# Patient Record
Sex: FEMALE | Race: WHITE
[De-identification: names, ages, dates, MRNs, and addresses within clinical notes are randomized per-mention and may not be internally consistent; named-entity substitution may affect disease eponyms.]

---

## 2018-08-24 ENCOUNTER — HOSPITAL ENCOUNTER (INPATIENT)
Dept: HOSPITAL 74 - JLDR | Age: 28
LOS: 2 days | Discharge: HOME | DRG: 560 | End: 2018-08-26
Attending: OBSTETRICS & GYNECOLOGY | Admitting: OBSTETRICS & GYNECOLOGY
Payer: COMMERCIAL

## 2018-08-24 VITALS — BODY MASS INDEX: 36.9 KG/M2

## 2018-08-24 DIAGNOSIS — O48.0: Primary | ICD-10-CM

## 2018-08-24 DIAGNOSIS — Z3A.40: ICD-10-CM

## 2018-08-24 LAB
ANION GAP SERPL CALC-SCNC: 10 MMOL/L (ref 8–16)
APTT BLD: 24.3 SECONDS (ref 25.2–36.5)
BASOPHILS # BLD: 0.8 % (ref 0–2)
BUN SERPL-MCNC: 7 MG/DL (ref 7–18)
CALCIUM SERPL-MCNC: 8.5 MG/DL (ref 8.5–10.1)
CHLORIDE SERPL-SCNC: 107 MMOL/L (ref 98–107)
CO2 SERPL-SCNC: 22 MMOL/L (ref 21–32)
CREAT SERPL-MCNC: 0.5 MG/DL (ref 0.55–1.02)
DEPRECATED RDW RBC AUTO: 16 % (ref 11.6–15.6)
EOSINOPHIL # BLD: 1.4 % (ref 0–4.5)
GLUCOSE SERPL-MCNC: 81 MG/DL (ref 74–106)
HCT VFR BLD CALC: 32.1 % (ref 32.4–45.2)
HGB BLD-MCNC: 10.2 GM/DL (ref 10.7–15.3)
INR BLD: 0.91 (ref 0.83–1.09)
LYMPHOCYTES # BLD: 37.1 % (ref 8–40)
MCH RBC QN AUTO: 23.9 PG (ref 25.7–33.7)
MCHC RBC AUTO-ENTMCNC: 31.9 G/DL (ref 32–36)
MCV RBC: 75 FL (ref 80–96)
MONOCYTES # BLD AUTO: 5.3 % (ref 3.8–10.2)
NEUTROPHILS # BLD: 55.4 % (ref 42.8–82.8)
PLATELET # BLD AUTO: 292 K/MM3 (ref 134–434)
PMV BLD: 9.5 FL (ref 7.5–11.1)
POTASSIUM SERPLBLD-SCNC: 4.2 MMOL/L (ref 3.5–5.1)
PT PNL PPP: 10.3 SEC (ref 9.7–13)
RBC # BLD AUTO: 4.28 M/MM3 (ref 3.6–5.2)
SODIUM SERPL-SCNC: 139 MMOL/L (ref 136–145)
WBC # BLD AUTO: 8.5 K/MM3 (ref 4–10)

## 2018-08-24 PROCEDURE — 0HQ9XZZ REPAIR PERINEUM SKIN, EXTERNAL APPROACH: ICD-10-PCS | Performed by: OBSTETRICS & GYNECOLOGY

## 2018-08-24 RX ADMIN — ACETAMINOPHEN PRN MG: 325 TABLET ORAL at 13:10

## 2018-08-24 RX ADMIN — IBUPROFEN PRN MG: 600 TABLET, FILM COATED ORAL at 13:10

## 2018-08-24 RX ADMIN — SODIUM CHLORIDE, SODIUM LACTATE, POTASSIUM CHLORIDE, CALCIUM CHLORIDE AND DEXTROSE MONOHYDRATE SCH MLS/HR: 5; 600; 310; 30; 20 INJECTION, SOLUTION INTRAVENOUS at 06:20

## 2018-08-24 RX ADMIN — FERROUS SULFATE TAB EC 324 MG (65 MG FE EQUIVALENT) SCH MG: 324 (65 FE) TABLET DELAYED RESPONSE at 17:05

## 2018-08-24 NOTE — HP
Past Medical History





- Primary Care Physician


PCP:: Michael Ramirez





- Admission


Chief Complaint: pregnancy 40 weeks , labor


History of Present Illness: 





29 yo f  edc 18 in labor, cx 3 cm ,80 vx -2 mi, bulging , fhr cat 1

, irregular contraction. prenatal care at [facility] care


History Source: Patient


Limitations to Obtaining History: No Limitations





- Past Medical History


...: 2


...Para: 1


...Term: 1


...: 0


...Spon : 0


...Induced : 0


...Multiple Gestation: 0


...LMP: 11/15/17


... Weeks Gestation by Dates: 40.2


...EDC by Dates: 18


...EDC by Sono: 18





- Past Surgical History


Hx Myomectomy: No


Hx Transabdominal Cerclage: No





- Smoking History


Smoking history: Never smoked


Have you smoked in the past 12 months: No





- Alcohol/Substance Use


Hx Alcohol Use: No





- Social History


History of Recent Travel: No





Home Medications





- Allergies


Allergies/Adverse Reactions: 


 Allergies











Allergy/AdvReac Type Severity Reaction Status Date / Time


 


No Known Drug Allergies Allergy   Verified 18 06:31














- Home Medications


Home Medications: 


Ambulatory Orders





Ferrous Sulfate [Feosol] 325 mg PO DAILY 18 


Prenatal Vit 108/Iron/Folic AC [Prenatal One Tablet] 1 each PO DAILY 18 











Review of Systems





- Review of Systems


Constitutional: reports: No Symptoms


Eyes: reports: No Symptoms


HENT: reports: No Symptoms


Neck: reports: No Symptoms


Cardiovascular: reports: No Symptoms


Respiratory: reports: No Symptoms


Gastrointestinal: reports: No Symptoms


Genitourinary: reports: No Symptoms


Breasts: reports: No Symptoms Reported


Musculoskeletal: reports: No Symptoms


Integumentary: reports: No Symptoms


Neurological: reports: No Symptoms


Endocrine: reports: No Symptoms


Hematology/Lymphatic: reports: No Symptoms


Psychiatric: reports: No Symptoms





Physical Exam - Maternity


Vital Signs: 


 Vital Signs











Temperature  98.9 F   18 07:00


 


Pulse Rate  99 H  18 07:00


 


Respiratory Rate  20   18 07:00


 


Blood Pressure  135/83   18 07:00


 


O2 Sat by Pulse Oximetry (%)      











Constitutional: Yes: Well Nourished, No Distress, Calm


Eyes: Yes: WNL, Conjunctiva Clear, EOM Intact


HENT: Yes: WNL, Atraumatic, Normocephalic


Neck: Yes: WNL, Supple, Trachea Midline


Cardiovascular: Yes: WNL, Regular Rate and Rhythm


Breast(s): Yes: WNL





- Abdominal Exam/OB


Fundal Height: 40


Number of Fetuses: Single


Fetal Presentation: Vertex


Contractions: Yes


Regularity: Irregular


Intensity: Moderate


Fetal Monitor Mode: External


Fetal Heart Rate Location: [facility]


Category: I


Accelerations: Uniform


Decelerations: None





- Vaginal Exam/OB


Vaginal Bleediing: No


Speculum Exam: No


Dilatation (cm): 3 cm


Effacement (%): 80


Amniotic Membrane Status: Bulging


Fetal Presentation: Vertex/Position


Fetal Station: -2





- Physical Exam


Musculoskeletal: Yes: WNL


Extremities: Yes: WNL


Edema: LLE: Trace, RLE: Trace


Deep Tendon Reflex Grade: Normal +2





- Labs


Lab Results: 


 CBC, BMP





 18 06:35 











Hemorrhage Risk Assessment





- Risk Factors


Medium Risk Factors: Yes: None


High Risk Factors: Yes: None


Risk Score: 1


Risk Level: Medium Risk





Problem List





- Problems


(1) 40 weeks gestation of pregnancy


Code(s): Z3A.40 - 40 WEEKS GESTATION OF PREGNANCY   





(2) Labor established


Code(s): RTM5870 -    





Assessment/Plan





admit, fhm, GBS positive, iv antibiotic , 


pitocin stimulation, rba discussed

## 2018-08-25 LAB
BASOPHILS # BLD: 0.8 % (ref 0–2)
DEPRECATED RDW RBC AUTO: 16.3 % (ref 11.6–15.6)
EOSINOPHIL # BLD: 0.9 % (ref 0–4.5)
HCT VFR BLD CALC: 29.7 % (ref 32.4–45.2)
HGB BLD-MCNC: 9.3 GM/DL (ref 10.7–15.3)
LYMPHOCYTES # BLD: 30.5 % (ref 8–40)
MCH RBC QN AUTO: 23.6 PG (ref 25.7–33.7)
MCHC RBC AUTO-ENTMCNC: 31.3 G/DL (ref 32–36)
MCV RBC: 75.4 FL (ref 80–96)
MONOCYTES # BLD AUTO: 4.7 % (ref 3.8–10.2)
NEUTROPHILS # BLD: 63.1 % (ref 42.8–82.8)
PLATELET # BLD AUTO: 270 K/MM3 (ref 134–434)
PMV BLD: 9.3 FL (ref 7.5–11.1)
RBC # BLD AUTO: 3.94 M/MM3 (ref 3.6–5.2)
WBC # BLD AUTO: 11.2 K/MM3 (ref 4–10)

## 2018-08-25 RX ADMIN — ACETAMINOPHEN PRN MG: 325 TABLET ORAL at 22:21

## 2018-08-25 RX ADMIN — SODIUM CHLORIDE, SODIUM LACTATE, POTASSIUM CHLORIDE, CALCIUM CHLORIDE AND DEXTROSE MONOHYDRATE SCH: 5; 600; 310; 30; 20 INJECTION, SOLUTION INTRAVENOUS at 22:35

## 2018-08-25 RX ADMIN — Medication SCH: at 22:35

## 2018-08-25 RX ADMIN — Medication SCH: at 09:03

## 2018-08-25 RX ADMIN — FERROUS SULFATE TAB EC 324 MG (65 MG FE EQUIVALENT) SCH MG: 324 (65 FE) TABLET DELAYED RESPONSE at 08:48

## 2018-08-25 RX ADMIN — FERROUS SULFATE TAB EC 324 MG (65 MG FE EQUIVALENT) SCH MG: 324 (65 FE) TABLET DELAYED RESPONSE at 17:52

## 2018-08-25 RX ADMIN — IBUPROFEN PRN MG: 600 TABLET, FILM COATED ORAL at 22:21

## 2018-08-25 RX ADMIN — Medication SCH TAB: at 09:48

## 2018-08-25 NOTE — PN
Post Partum Progress Note





- Subjective


Subjective: 





no complains, except mild cramps 


Post Partum Day: 1


Type of Delivery: 


Vital Signs: 


 Vital Signs











Temperature  98.0 F   18 06:00


 


Pulse Rate  60   18 06:00


 


Respiratory Rate  20   18 06:00


 


Blood Pressure  120/80   18 06:00


 


O2 Sat by Pulse Oximetry (%)      











Breast Exam: Yes: Soft, Other (Breast & bottle feeding ).  No: Engorged


Uterus: Yes: Fundus Firm, Fundus below umbilicus, Non-tender


Lochia: Yes: Rubra


Lochia, amount: Moderate


Extremities: Yes: Calves non-tender, Edema


Perineum: Yes: Laceration


Activity: Ambulating





- Labs


Labs: 


 CBC











WBC  8.5 K/mm3 (4.0-10.0)   18  06:35    


 


RBC  4.28 M/mm3 (3.60-5.2)   18  06:35    


 


Hgb  10.2 GM/dL (10.7-15.3)  L  18  06:35    


 


Hct  32.1 % (32.4-45.2)  L  18  06:35    


 


MCV  75.0 fl (80-96)  L  18  06:35    


 


MCH  23.9 pg (25.7-33.7)  L  18  06:35    


 


MCHC  31.9 g/dl (32.0-36.0)  L  18  06:35    


 


RDW  16.0 % (11.6-15.6)  H  18  06:35    


 


Plt Count  292 K/MM3 (134-434)   18  06:35    


 


MPV  9.5 fl (7.5-11.1)   18  06:35    


 


Absolute Neuts (auto)  4.7 K/mm3 (1.5-8.0)   18  06:35    


 


Neutrophils %  55.4 % (42.8-82.8)   18  06:35    


 


Lymphocytes %  37.1 % (8-40)   18  06:35    


 


Monocytes %  5.3 % (3.8-10.2)   18  06:35    


 


Eosinophils %  1.4 % (0-4.5)   18  06:35    


 


Basophils %  0.8 % (0-2.0)   18  06:35    


 


Nucleated RBC %  0 % (0-0)   18  06:35    














Problem List





- Problems


(1) Vaginal delivery


Code(s): O80 - ENCOUNTER FOR FULL-TERM UNCOMPLICATED DELIVERY   





(2) Encounter for  visit


Code(s): Z39.2 - ENCOUNTER FOR ROUTINE POSTPARTUM FOLLOW-UP

## 2018-08-26 VITALS — SYSTOLIC BLOOD PRESSURE: 122 MMHG | DIASTOLIC BLOOD PRESSURE: 78 MMHG | TEMPERATURE: 97.9 F | HEART RATE: 71 BPM

## 2018-08-26 RX ADMIN — Medication SCH TAB: at 10:33

## 2018-08-26 RX ADMIN — FERROUS SULFATE TAB EC 324 MG (65 MG FE EQUIVALENT) SCH MG: 324 (65 FE) TABLET DELAYED RESPONSE at 08:31

## 2018-08-26 NOTE — PN
Post Partum Progress Note





- Subjective


Subjective: 


asymptomatic





Post Partum Day: 2


Type of Delivery: 


Vital Signs: 


 Vital Signs











Temperature  97.9 F   18 08:49


 


Pulse Rate  71   18 08:49


 


Respiratory Rate  18   18 08:49


 


Blood Pressure  122/78   18 08:49


 


O2 Sat by Pulse Oximetry (%)      











Breast Exam: Yes: Soft, Other (BF ).  No: Engorged


Uterus: Yes: Fundus Firm, Fundus below umbilicus, Non-tender


Lochia: Yes: Rubra


Lochia, amount: Moderate


Extremities: Yes: Calves non-tender


Perineum: Yes: Laceration (no c/operineal soreness)


Activity: Ambulating





- Labs


Labs: 


 CBC











WBC  11.2 K/mm3 (4.0-10.0)  H  18  08:30    


 


RBC  3.94 M/mm3 (3.60-5.2)   18  08:30    


 


Hgb  9.3 GM/dL (10.7-15.3)  L  18  08:30    


 


Hct  29.7 % (32.4-45.2)  L  18  08:30    


 


MCV  75.4 fl (80-96)  L  18  08:30    


 


MCH  23.6 pg (25.7-33.7)  L  18  08:30    


 


MCHC  31.3 g/dl (32.0-36.0)  L  18  08:30    


 


RDW  16.3 % (11.6-15.6)  H  18  08:30    


 


Plt Count  270 K/MM3 (134-434)   18  08:30    


 


MPV  9.3 fl (7.5-11.1)   18  08:30    


 


Absolute Neuts (auto)  7.1 K/mm3 (1.5-8.0)   18  08:30    


 


Neutrophils %  63.1 % (42.8-82.8)   18  08:30    


 


Lymphocytes %  30.5 % (8-40)   18  08:30    


 


Monocytes %  4.7 % (3.8-10.2)   18  08:30    


 


Eosinophils %  0.9 % (0-4.5)   18  08:30    


 


Basophils %  0.8 % (0-2.0)   18  08:30    


 


Nucleated RBC %  0 % (0-0)   18  08:30    














Problem List





- Problems


(1) Vaginal delivery


Code(s): O80 - ENCOUNTER FOR FULL-TERM UNCOMPLICATED DELIVERY   





(2) Encounter for  visit


Code(s): Z39.2 - ENCOUNTER FOR ROUTINE POSTPARTUM FOLLOW-UP   





Assessment/Plan


stable 


anemia conselled 


plan discharge today

## 2018-08-28 NOTE — DS
Physical Exam-GYN


Vital Signs: 


 Vital Signs











Temperature  97.9 F   18 08:49


 


Pulse Rate  71   18 08:49


 


Respiratory Rate  18   18 08:49


 


Blood Pressure  122/78   18 08:49


 


O2 Sat by Pulse Oximetry (%)      











Constitutional: Yes: Well Nourished, No Distress, Calm


Eyes: Yes: WNL, Conjunctiva Clear, EOM Intact


HENT: Yes: WNL, Atraumatic, Normocephalic


Neck: Yes: WNL, Supple, Trachea Midline


Cardiovascular: Yes: WNL, Regular Rate and Rhythm


Respiratory: Yes: WNL, Regular, CTA Bilaterally


Gastrointestinal: Yes: WNL


...Rectal Exam: Yes: WNL


Renal/: Yes: WNL


....Post Partum: Yes: Uterus firm, Uterus non-tender, Slight lochia rubra


Breast(s): Yes: WNL


Musculoskeletal: Yes: WNL


Extremities: Yes: WNL


Edema: No


Integumentary: Yes: WNL


Neurological: Yes: WNL, Alert, Oriented


...Motor Strength: WNL


Psychiatric: Yes: WNL, Alert, Oriented


Labs: 


 CBC, BMP





 18 08:30 





 18 06:35 











Delivery





- Delivery


Vaginal Delivery: Spontaneous (no complication)


Type of Anesthesia: Local


Episiotomy/Laceration: Vaginal Extension/lac, 1st degree


EBL (cc): 300





Delivery, Single Birth





- Stages of Labor


Date 1st Stage Initiatied: 18


Time 1st Stage Initiated: 04:00


Date 2nd Stage Initiated: 18


Time 2nd Stage Initiated: 11:35


Date of Delivery: 18


Time of Delivery: 11:45


Time Placenta Delivered: 11:55


Placenta: Yes: Spontaneous





- Condition of Infant


Pediatrician/Neonatologist Present: No


Infant Gender: Female


Birth Weight: 6 lb 13 oz


Position: Left, OA


Total Hours ROM (Hrs/Mins): 3h 41min





- Apgar


  ** 1 Minute


Apgar Total Score: 9





  ** 5 Minutes


Apgar Total Score: 9





- [place] Feeding Plan


Initial Plan: Exclusive breastfeeding throughout hospitalization





Discharge Summary


Reason For Visit: ADMIT LABOR


Procedures: Principal: 


Hospital Course: 





no complication


Condition: Stable





- Instructions


Diet, Activity, Other Instructions: 


Discharge Instructions


* Out of Bed


* Breastfeeding


* Regular Diet


* Heather Care


* Avoid sex for 6 weeks


* RTC 6 weeks 





 


If you experience excessive bleeding or fever over 101 degrees, call doctor, 

the clinic or go to the Emergency Room.


Referrals: 


Michael Ramirez MD [Staff Physician] - 


Disposition: HOME





- Home Medications


Comprehensive Discharge Medication List: 


Ambulatory Orders





Ferrous Sulfate [Feosol] 325 mg PO DAILY 18 


Prenatal Vit 108/Iron/Folic AC [Prenatal One Tablet] 1 each PO DAILY 18 


Acetaminophen [Tylenol .Regular Strength -] 650 mg PO Q3H PRN  tablet 18 


Benzocaine [Americaine 20% Spray -] 1 spray TP PRN PRN  bottle 18 


Ferrous Sulfate [Feosol] 325 mg PO BIDWM  tab 18 


Ibuprofen [Motrin -] 200 mg PO Q4H PRN  tablet 18 


Prenatal Vitamins (Sjr) - 1 tab PO DAILY  tablet 18 


Witch Hazel 50% (Tucks) [Tucks Pads -] 1 pad TP PRN PRN  pad 18

## 2021-02-21 ENCOUNTER — HOSPITAL ENCOUNTER (EMERGENCY)
Dept: HOSPITAL 74 - JER | Age: 31
LOS: 1 days | Discharge: HOME | End: 2021-02-22
Payer: COMMERCIAL

## 2021-02-21 VITALS — TEMPERATURE: 98 F

## 2021-02-21 VITALS — BODY MASS INDEX: 37 KG/M2

## 2021-02-21 DIAGNOSIS — O20.0: Primary | ICD-10-CM

## 2021-02-21 LAB
ALBUMIN SERPL-MCNC: 3.6 G/DL (ref 3.4–5)
ALP SERPL-CCNC: 85 U/L (ref 45–117)
ALT SERPL-CCNC: 19 U/L (ref 13–61)
ANION GAP SERPL CALC-SCNC: 5 MMOL/L (ref 8–16)
AST SERPL-CCNC: 11 U/L (ref 15–37)
BASOPHILS # BLD: 0.7 % (ref 0–2)
BILIRUB SERPL-MCNC: 0.2 MG/DL (ref 0.2–1)
BUN SERPL-MCNC: 14.7 MG/DL (ref 7–18)
CALCIUM SERPL-MCNC: 9.5 MG/DL (ref 8.5–10.1)
CHLORIDE SERPL-SCNC: 105 MMOL/L (ref 98–107)
CO2 SERPL-SCNC: 28 MMOL/L (ref 21–32)
CREAT SERPL-MCNC: 0.8 MG/DL (ref 0.55–1.3)
DEPRECATED RDW RBC AUTO: 14.6 % (ref 11.6–15.6)
EOSINOPHIL # BLD: 0.7 % (ref 0–4.5)
GLUCOSE SERPL-MCNC: 126 MG/DL (ref 74–106)
HCT VFR BLD CALC: 38.3 % (ref 32.4–45.2)
HGB BLD-MCNC: 12.8 GM/DL (ref 10.7–15.3)
LYMPHOCYTES # BLD: 24.6 % (ref 8–40)
MCH RBC QN AUTO: 28 PG (ref 25.7–33.7)
MCHC RBC AUTO-ENTMCNC: 33.4 G/DL (ref 32–36)
MCV RBC: 83.9 FL (ref 80–96)
MONOCYTES # BLD AUTO: 4.9 % (ref 3.8–10.2)
NEUTROPHILS # BLD: 69.1 % (ref 42.8–82.8)
PLATELET # BLD AUTO: 380 K/MM3 (ref 134–434)
PMV BLD: 8.1 FL (ref 7.5–11.1)
POTASSIUM SERPLBLD-SCNC: 3.9 MMOL/L (ref 3.5–5.1)
PROT SERPL-MCNC: 7.7 G/DL (ref 6.4–8.2)
RBC # BLD AUTO: 4.56 M/MM3 (ref 3.6–5.2)
SODIUM SERPL-SCNC: 138 MMOL/L (ref 136–145)
WBC # BLD AUTO: 13.3 K/MM3 (ref 4–10)

## 2021-02-21 PROCEDURE — 3E0333Z INTRODUCTION OF ANTI-INFLAMMATORY INTO PERIPHERAL VEIN, PERCUTANEOUS APPROACH: ICD-10-PCS

## 2021-02-22 VITALS — SYSTOLIC BLOOD PRESSURE: 129 MMHG | DIASTOLIC BLOOD PRESSURE: 90 MMHG | HEART RATE: 89 BPM

## 2021-02-25 ENCOUNTER — HOSPITAL ENCOUNTER (EMERGENCY)
Dept: HOSPITAL 74 - JERFT | Age: 31
Discharge: HOME | End: 2021-02-25
Payer: COMMERCIAL

## 2021-02-25 VITALS — BODY MASS INDEX: 38.3 KG/M2

## 2021-02-25 VITALS — TEMPERATURE: 97.9 F | HEART RATE: 82 BPM | SYSTOLIC BLOOD PRESSURE: 136 MMHG | DIASTOLIC BLOOD PRESSURE: 82 MMHG

## 2021-02-25 DIAGNOSIS — O03.9: Primary | ICD-10-CM

## 2024-09-09 ENCOUNTER — HOSPITAL ENCOUNTER (INPATIENT)
Dept: HOSPITAL 74 - JLDR | Age: 34
LOS: 2 days | Discharge: HOME | DRG: 560 | End: 2024-09-11
Attending: OBSTETRICS & GYNECOLOGY | Admitting: OBSTETRICS & GYNECOLOGY
Payer: COMMERCIAL

## 2024-09-09 VITALS — RESPIRATION RATE: 18 BRPM

## 2024-09-09 VITALS — BODY MASS INDEX: 40.8 KG/M2

## 2024-09-09 DIAGNOSIS — Z3A.39: ICD-10-CM

## 2024-09-09 LAB
ANION GAP SERPL CALC-SCNC: 11 MMOL/L (ref 4–13)
APTT BLD: 28.8 SECONDS (ref 25.2–36.5)
BASE EXCESS BLDCOA CALC-SCNC: -2.3 MMOL/L (ref 0–2)
BASE EXCESS BLDCOA CALC-SCNC: -4 MMOL/L (ref 0–2)
BASOPHILS # BLD: 0.6 % (ref 0–2)
BUN SERPL-MCNC: 8.9 MG/DL (ref 7–18)
CALCIUM SERPL-MCNC: 8.7 MG/DL (ref 8.5–10.1)
CHLORIDE SERPL-SCNC: 105 MMOL/L (ref 98–107)
CO2 SERPL-SCNC: 21 MMOL/L (ref 21–32)
CREAT SERPL-MCNC: 0.7 MG/DL (ref 0.55–1.3)
DEPRECATED RDW RBC AUTO: 16.2 % (ref 11.6–15.6)
EOSINOPHIL # BLD: 0.5 % (ref 0–4.5)
GLUCOSE SERPL-MCNC: 115 MG/DL (ref 74–106)
HCO3 BLDCO-SCNC: 22 MMHG (ref 20–29)
HCO3 BLDCO-SCNC: 23.3 MMHG (ref 20–29)
HCT VFR BLD CALC: 31.3 % (ref 32.4–45.2)
HGB BLD-MCNC: 10.2 GM/DL (ref 10.7–15.3)
HIV 1+2 AB+HIV1 P24 AG SERPL QL IA: NEGATIVE
INR BLD: 0.89 (ref 0.83–1.09)
LYMPHOCYTES # BLD: 15.4 % (ref 8–40)
MCH RBC QN AUTO: 24 PG (ref 25.7–33.7)
MCHC RBC AUTO-ENTMCNC: 32.5 G/DL (ref 32–36)
MCV RBC: 73.8 FL (ref 80–96)
MONOCYTES # BLD AUTO: 4.8 % (ref 3.8–10.2)
NEUTROPHILS # BLD: 78.7 % (ref 42.8–82.8)
PCO2 BLDCO: 36.8 MMHG (ref 30–78)
PCO2 BLDCO: 50.5 MMHG (ref 30–78)
PH BLDCO: 7.28 [PH] (ref 7.14–7.44)
PH BLDCO: 7.39 [PH] (ref 7.14–7.44)
PLATELET # BLD AUTO: 344 10^3/UL (ref 134–434)
PMV BLD: 8.9 FL (ref 7.5–11.1)
POTASSIUM SERPLBLD-SCNC: 3.9 MMOL/L (ref 3.5–5.1)
PT PNL PPP: 10.3 SEC (ref 9.7–13)
RBC # BLD AUTO: 4.24 M/MM3 (ref 3.6–5.2)
SODIUM SERPL-SCNC: 137 MMOL/L (ref 136–145)
WBC # BLD AUTO: 12.2 K/MM3 (ref 4–10)

## 2024-09-09 RX ADMIN — METHYLERGONOVINE MALEATE ONE MG: 0.2 INJECTION INTRAVENOUS at 04:40

## 2024-09-09 RX ADMIN — ACETAMINOPHEN PRN MG: 325 TABLET ORAL at 05:25

## 2024-09-09 RX ADMIN — IBUPROFEN PRN MG: 600 TABLET, FILM COATED ORAL at 03:21

## 2024-09-09 RX ADMIN — SODIUM CHLORIDE, SODIUM GLUCONATE, SODIUM ACETATE, POTASSIUM CHLORIDE, AND MAGNESIUM CHLORIDE SCH MLS/HR: 526; 502; 368; 37; 30 INJECTION, SOLUTION INTRAVENOUS at 02:45

## 2024-09-09 RX ADMIN — Medication SCH MLS/HR: at 03:10

## 2024-09-10 LAB
BASOPHILS # BLD: 0.7 % (ref 0–2)
DEPRECATED RDW RBC AUTO: 16.4 % (ref 11.6–15.6)
EOSINOPHIL # BLD: 1.7 % (ref 0–4.5)
HCT VFR BLD CALC: 30.2 % (ref 32.4–45.2)
HGB BLD-MCNC: 9.8 GM/DL (ref 10.7–15.3)
LYMPHOCYTES # BLD: 41.2 % (ref 8–40)
MCH RBC QN AUTO: 23.9 PG (ref 25.7–33.7)
MCHC RBC AUTO-ENTMCNC: 32.3 G/DL (ref 32–36)
MCV RBC: 73.8 FL (ref 80–96)
MONOCYTES # BLD AUTO: 4.9 % (ref 3.8–10.2)
NEUTROPHILS # BLD: 51.5 % (ref 42.8–82.8)
PLATELET # BLD AUTO: 351 10^3/UL (ref 134–434)
PMV BLD: 8.5 FL (ref 7.5–11.1)
RBC # BLD AUTO: 4.09 M/MM3 (ref 3.6–5.2)
WBC # BLD AUTO: 9.5 K/MM3 (ref 4–10)

## 2024-09-11 VITALS — SYSTOLIC BLOOD PRESSURE: 137 MMHG | TEMPERATURE: 98.2 F | DIASTOLIC BLOOD PRESSURE: 84 MMHG | HEART RATE: 83 BPM
